# Patient Record
Sex: FEMALE | Race: ASIAN | NOT HISPANIC OR LATINO | URBAN - METROPOLITAN AREA
[De-identification: names, ages, dates, MRNs, and addresses within clinical notes are randomized per-mention and may not be internally consistent; named-entity substitution may affect disease eponyms.]

---

## 2017-04-09 ENCOUNTER — INPATIENT (INPATIENT)
Facility: HOSPITAL | Age: 67
LOS: 8 days | Discharge: NURSING, REHAB OR LONG-TERM CARE | DRG: 563 | End: 2017-04-18
Attending: INTERNAL MEDICINE | Admitting: INTERNAL MEDICINE
Payer: MEDICARE

## 2017-04-09 VITALS
HEART RATE: 84 BPM | TEMPERATURE: 98 F | SYSTOLIC BLOOD PRESSURE: 141 MMHG | OXYGEN SATURATION: 99 % | RESPIRATION RATE: 18 BRPM | DIASTOLIC BLOOD PRESSURE: 76 MMHG
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
## 2017-04-09 DIAGNOSIS — M19.90 UNSPECIFIED OSTEOARTHRITIS, UNSPECIFIED SITE: ICD-10-CM

## 2017-04-09 DIAGNOSIS — I10 ESSENTIAL (PRIMARY) HYPERTENSION: ICD-10-CM

## 2017-04-09 DIAGNOSIS — Z29.9 ENCOUNTER FOR PROPHYLACTIC MEASURES, UNSPECIFIED: ICD-10-CM

## 2017-04-09 DIAGNOSIS — N39.0 URINARY TRACT INFECTION, SITE NOT SPECIFIED: ICD-10-CM

## 2017-04-09 DIAGNOSIS — E11.9 TYPE 2 DIABETES MELLITUS WITHOUT COMPLICATIONS: ICD-10-CM

## 2017-04-09 DIAGNOSIS — S82.143A DISPLACED BICONDYLAR FRACTURE OF UNSPECIFIED TIBIA, INITIAL ENCOUNTER FOR CLOSED FRACTURE: ICD-10-CM

## 2017-04-09 DIAGNOSIS — E03.9 HYPOTHYROIDISM, UNSPECIFIED: ICD-10-CM

## 2017-04-09 DIAGNOSIS — R55 SYNCOPE AND COLLAPSE: ICD-10-CM

## 2017-04-09 LAB
ALBUMIN SERPL ELPH-MCNC: 3.2 G/DL — LOW (ref 3.5–5)
ALP SERPL-CCNC: 88 U/L — SIGNIFICANT CHANGE UP (ref 40–120)
ALT FLD-CCNC: 25 U/L DA — SIGNIFICANT CHANGE UP (ref 10–60)
ANION GAP SERPL CALC-SCNC: 10 MMOL/L — SIGNIFICANT CHANGE UP (ref 5–17)
APPEARANCE UR: CLEAR — SIGNIFICANT CHANGE UP
AST SERPL-CCNC: 16 U/L — SIGNIFICANT CHANGE UP (ref 10–40)
BASOPHILS # BLD AUTO: 0.1 K/UL — SIGNIFICANT CHANGE UP (ref 0–0.2)
BASOPHILS NFR BLD AUTO: 0.9 % — SIGNIFICANT CHANGE UP (ref 0–2)
BILIRUB SERPL-MCNC: 0.2 MG/DL — SIGNIFICANT CHANGE UP (ref 0.2–1.2)
BILIRUB UR-MCNC: NEGATIVE — SIGNIFICANT CHANGE UP
BUN SERPL-MCNC: 19 MG/DL — HIGH (ref 7–18)
CALCIUM SERPL-MCNC: 8.6 MG/DL — SIGNIFICANT CHANGE UP (ref 8.4–10.5)
CHLORIDE SERPL-SCNC: 106 MMOL/L — SIGNIFICANT CHANGE UP (ref 96–108)
CK MB CFR SERPL CALC: 1.6 NG/ML — SIGNIFICANT CHANGE UP (ref 0–3.6)
CO2 SERPL-SCNC: 29 MMOL/L — SIGNIFICANT CHANGE UP (ref 22–31)
COLOR SPEC: YELLOW — SIGNIFICANT CHANGE UP
CREAT SERPL-MCNC: 0.64 MG/DL — SIGNIFICANT CHANGE UP (ref 0.5–1.3)
DIFF PNL FLD: ABNORMAL
EOSINOPHIL # BLD AUTO: 0.3 K/UL — SIGNIFICANT CHANGE UP (ref 0–0.5)
EOSINOPHIL NFR BLD AUTO: 2.5 % — SIGNIFICANT CHANGE UP (ref 0–6)
GLUCOSE SERPL-MCNC: 137 MG/DL — HIGH (ref 70–99)
GLUCOSE UR QL: NEGATIVE — SIGNIFICANT CHANGE UP
HCT VFR BLD CALC: 34.7 % — SIGNIFICANT CHANGE UP (ref 34.5–45)
HGB BLD-MCNC: 11.8 G/DL — SIGNIFICANT CHANGE UP (ref 11.5–15.5)
KETONES UR-MCNC: NEGATIVE — SIGNIFICANT CHANGE UP
LEUKOCYTE ESTERASE UR-ACNC: ABNORMAL
LYMPHOCYTES # BLD AUTO: 3.3 K/UL — SIGNIFICANT CHANGE UP (ref 1–3.3)
LYMPHOCYTES # BLD AUTO: 31.8 % — SIGNIFICANT CHANGE UP (ref 13–44)
MCHC RBC-ENTMCNC: 27.8 PG — SIGNIFICANT CHANGE UP (ref 27–34)
MCHC RBC-ENTMCNC: 33.9 GM/DL — SIGNIFICANT CHANGE UP (ref 32–36)
MCV RBC AUTO: 82.1 FL — SIGNIFICANT CHANGE UP (ref 80–100)
MONOCYTES # BLD AUTO: 0.3 K/UL — SIGNIFICANT CHANGE UP (ref 0–0.9)
MONOCYTES NFR BLD AUTO: 3.2 % — SIGNIFICANT CHANGE UP (ref 2–14)
NEUTROPHILS # BLD AUTO: 6.5 K/UL — SIGNIFICANT CHANGE UP (ref 1.8–7.4)
NEUTROPHILS NFR BLD AUTO: 61.6 % — SIGNIFICANT CHANGE UP (ref 43–77)
NITRITE UR-MCNC: NEGATIVE — SIGNIFICANT CHANGE UP
PH UR: 6 — SIGNIFICANT CHANGE UP (ref 4.8–8)
PLATELET # BLD AUTO: 325 K/UL — SIGNIFICANT CHANGE UP (ref 150–400)
POTASSIUM SERPL-MCNC: 3.9 MMOL/L — SIGNIFICANT CHANGE UP (ref 3.5–5.3)
POTASSIUM SERPL-SCNC: 3.9 MMOL/L — SIGNIFICANT CHANGE UP (ref 3.5–5.3)
PROT SERPL-MCNC: 7.3 G/DL — SIGNIFICANT CHANGE UP (ref 6–8.3)
PROT UR-MCNC: NEGATIVE — SIGNIFICANT CHANGE UP
RBC # BLD: 4.22 M/UL — SIGNIFICANT CHANGE UP (ref 3.8–5.2)
RBC # FLD: 14 % — SIGNIFICANT CHANGE UP (ref 10.3–14.5)
SODIUM SERPL-SCNC: 145 MMOL/L — SIGNIFICANT CHANGE UP (ref 135–145)
SP GR SPEC: 1.01 — SIGNIFICANT CHANGE UP (ref 1.01–1.02)
TROPONIN I SERPL-MCNC: <0.015 NG/ML — SIGNIFICANT CHANGE UP (ref 0–0.04)
TSH SERPL-MCNC: 1.64 UU/ML — SIGNIFICANT CHANGE UP (ref 0.34–4.82)
UROBILINOGEN FLD QL: NEGATIVE — SIGNIFICANT CHANGE UP
WBC # BLD: 10.5 K/UL — SIGNIFICANT CHANGE UP (ref 3.8–10.5)
WBC # FLD AUTO: 10.5 K/UL — SIGNIFICANT CHANGE UP (ref 3.8–10.5)

## 2017-04-09 PROCEDURE — 99285 EMERGENCY DEPT VISIT HI MDM: CPT

## 2017-04-09 PROCEDURE — 71010: CPT | Mod: 26

## 2017-04-09 PROCEDURE — 73700 CT LOWER EXTREMITY W/O DYE: CPT | Mod: 26,RT

## 2017-04-09 PROCEDURE — 70450 CT HEAD/BRAIN W/O DYE: CPT | Mod: 26

## 2017-04-09 RX ORDER — ACETAMINOPHEN 500 MG
650 TABLET ORAL EVERY 6 HOURS
Qty: 0 | Refills: 0 | Status: DISCONTINUED | OUTPATIENT
Start: 2017-04-09 | End: 2017-04-09

## 2017-04-09 RX ORDER — CEFTRIAXONE 500 MG/1
INJECTION, POWDER, FOR SOLUTION INTRAMUSCULAR; INTRAVENOUS
Qty: 0 | Refills: 0 | Status: DISCONTINUED | OUTPATIENT
Start: 2017-04-09 | End: 2017-04-12

## 2017-04-09 RX ORDER — GLUCAGON INJECTION, SOLUTION 0.5 MG/.1ML
1 INJECTION, SOLUTION SUBCUTANEOUS ONCE
Qty: 0 | Refills: 0 | Status: DISCONTINUED | OUTPATIENT
Start: 2017-04-09 | End: 2017-04-14

## 2017-04-09 RX ORDER — LOSARTAN POTASSIUM 100 MG/1
1 TABLET, FILM COATED ORAL
Qty: 0 | Refills: 0 | COMMUNITY

## 2017-04-09 RX ORDER — DEXTROSE 50 % IN WATER 50 %
25 SYRINGE (ML) INTRAVENOUS ONCE
Qty: 0 | Refills: 0 | Status: DISCONTINUED | OUTPATIENT
Start: 2017-04-09 | End: 2017-04-18

## 2017-04-09 RX ORDER — LOSARTAN POTASSIUM 100 MG/1
100 TABLET, FILM COATED ORAL DAILY
Qty: 0 | Refills: 0 | Status: DISCONTINUED | OUTPATIENT
Start: 2017-04-09 | End: 2017-04-18

## 2017-04-09 RX ORDER — LEVOTHYROXINE SODIUM 125 MCG
1 TABLET ORAL
Qty: 0 | Refills: 0 | COMMUNITY

## 2017-04-09 RX ORDER — DEXTROSE 50 % IN WATER 50 %
12.5 SYRINGE (ML) INTRAVENOUS ONCE
Qty: 0 | Refills: 0 | Status: DISCONTINUED | OUTPATIENT
Start: 2017-04-09 | End: 2017-04-14

## 2017-04-09 RX ORDER — ATORVASTATIN CALCIUM 80 MG/1
40 TABLET, FILM COATED ORAL AT BEDTIME
Qty: 0 | Refills: 0 | Status: DISCONTINUED | OUTPATIENT
Start: 2017-04-09 | End: 2017-04-18

## 2017-04-09 RX ORDER — INSULIN LISPRO 100/ML
VIAL (ML) SUBCUTANEOUS
Qty: 0 | Refills: 0 | Status: DISCONTINUED | OUTPATIENT
Start: 2017-04-09 | End: 2017-04-18

## 2017-04-09 RX ORDER — METFORMIN HYDROCHLORIDE 850 MG/1
1 TABLET ORAL
Qty: 0 | Refills: 0 | COMMUNITY

## 2017-04-09 RX ORDER — ENOXAPARIN SODIUM 100 MG/ML
40 INJECTION SUBCUTANEOUS DAILY
Qty: 0 | Refills: 0 | Status: DISCONTINUED | OUTPATIENT
Start: 2017-04-09 | End: 2017-04-18

## 2017-04-09 RX ORDER — ACETAMINOPHEN 500 MG
650 TABLET ORAL ONCE
Qty: 0 | Refills: 0 | Status: COMPLETED | OUTPATIENT
Start: 2017-04-09 | End: 2017-04-09

## 2017-04-09 RX ORDER — CEFTRIAXONE 500 MG/1
1 INJECTION, POWDER, FOR SOLUTION INTRAMUSCULAR; INTRAVENOUS EVERY 24 HOURS
Qty: 0 | Refills: 0 | Status: DISCONTINUED | OUTPATIENT
Start: 2017-04-10 | End: 2017-04-12

## 2017-04-09 RX ORDER — LEVOTHYROXINE SODIUM 125 MCG
50 TABLET ORAL DAILY
Qty: 0 | Refills: 0 | Status: DISCONTINUED | OUTPATIENT
Start: 2017-04-09 | End: 2017-04-18

## 2017-04-09 RX ORDER — TRAMADOL HYDROCHLORIDE 50 MG/1
25 TABLET ORAL EVERY 12 HOURS
Qty: 0 | Refills: 0 | Status: DISCONTINUED | OUTPATIENT
Start: 2017-04-09 | End: 2017-04-12

## 2017-04-09 RX ORDER — SODIUM CHLORIDE 9 MG/ML
1000 INJECTION, SOLUTION INTRAVENOUS
Qty: 0 | Refills: 0 | Status: DISCONTINUED | OUTPATIENT
Start: 2017-04-09 | End: 2017-04-14

## 2017-04-09 RX ORDER — ACETAMINOPHEN 500 MG
650 TABLET ORAL EVERY 6 HOURS
Qty: 0 | Refills: 0 | Status: DISCONTINUED | OUTPATIENT
Start: 2017-04-09 | End: 2017-04-18

## 2017-04-09 RX ORDER — DEXTROSE 50 % IN WATER 50 %
1 SYRINGE (ML) INTRAVENOUS ONCE
Qty: 0 | Refills: 0 | Status: DISCONTINUED | OUTPATIENT
Start: 2017-04-09 | End: 2017-04-14

## 2017-04-09 RX ORDER — IBUPROFEN 200 MG
0 TABLET ORAL
Qty: 0 | Refills: 0 | COMMUNITY

## 2017-04-09 RX ORDER — CEFTRIAXONE 500 MG/1
1 INJECTION, POWDER, FOR SOLUTION INTRAMUSCULAR; INTRAVENOUS ONCE
Qty: 0 | Refills: 0 | Status: COMPLETED | OUTPATIENT
Start: 2017-04-09 | End: 2017-04-09

## 2017-04-09 RX ORDER — ATORVASTATIN CALCIUM 80 MG/1
1 TABLET, FILM COATED ORAL
Qty: 0 | Refills: 0 | COMMUNITY

## 2017-04-09 RX ADMIN — TRAMADOL HYDROCHLORIDE 25 MILLIGRAM(S): 50 TABLET ORAL at 23:15

## 2017-04-09 RX ADMIN — CEFTRIAXONE 100 GRAM(S): 500 INJECTION, POWDER, FOR SOLUTION INTRAMUSCULAR; INTRAVENOUS at 22:30

## 2017-04-09 RX ADMIN — Medication 650 MILLIGRAM(S): at 14:03

## 2017-04-09 RX ADMIN — Medication 50 MILLIGRAM(S): at 23:15

## 2017-04-09 RX ADMIN — ATORVASTATIN CALCIUM 40 MILLIGRAM(S): 80 TABLET, FILM COATED ORAL at 22:30

## 2017-04-09 NOTE — ED PROCEDURE NOTE - CPROC ED POST PROC CARE GUIDE1
Verbal/written post procedure instructions were given to patient/caregiver./Instructed patient/caregiver regarding signs and symptoms of infection./Keep the cast/splint/dressing clean and dry./Instructed patient/caregiver to follow-up with primary care physician./Elevate the injured extremity as instructed.

## 2017-04-09 NOTE — ED PROVIDER NOTE - PHYSICAL EXAMINATION
GENERAL: No acute distress, non toxic  HEAD: Atraumatic, normocephalic  EARS: Externally normal, atraumatic, TMs normal bilaterally  EYES: No jaundice, not injected, no rupture, no foreign bodies  MOUTH: Moist mucous membranes, no open lesion, uvula midline without edema, no exudates, no peritonsilar abscess bilaterally.  NECK: Supple, full range of motion, no swelling, no lymphadenopathy  HEART: Regular rate and rhythm, no murmurs, no rubs, no gallops  LUNGS: Clear to auscultation bilaterally without rhonci, rales, or wheezing  ABDOMEN: Soft and non tender in all 4 quadrants, normal bowel sounds, no signs of trauma, no costovertebral tenderness bilaterally  BACK/SPINE: Non tender spine in cervical/thoracic/lumbar regions, no stepoffs palpable  EXTREMITIES: Tenderness and swelling to R knee, decreased ROM secondary to pain, no open wounds.   VASCULAR: Pulses palpable in all extremities, no pitting edema, capillary refill <2 secs  SKIN: Grossly intact without rash or open wounds  PSYCH: Alert and oriented x 3  GAIT: Normal without need for assistance

## 2017-04-09 NOTE — H&P ADULT. - RS GEN PE MLT RESP DETAILS PC
good air movement/clear to auscultation bilaterally/no wheezes/no chest wall tenderness/respirations non-labored/airway patent/no rhonchi/no rales/normal/breath sounds equal/no intercostal retractions/no subcutaneous emphysema

## 2017-04-09 NOTE — H&P ADULT. - NEGATIVE SKIN SYMPTOMS
no change in size/color of mole/no pitted nails/no tumor/no rash/no dryness/no itching/no brittle nails

## 2017-04-09 NOTE — H&P ADULT. - GASTROINTESTINAL DETAILS
soft/no guarding/no rebound tenderness/nontender/normal/no organomegaly/no distention/no rigidity/bowel sounds normal/no bruit/no masses palpable

## 2017-04-09 NOTE — H&P ADULT. - ATTENDING COMMENTS
66 year old female lives at home alone , AAO x 3 , walks with walker hx b/l knee arthritis , baseline trouble in walking , DM ( on metformin ) , HTN , Hypothyroidism came her for 1 episode of fall yesterday evening. As per patient she was in her usual state of health until yesterday evening when she was walking in to apartment and she tripped fell near by elevator. She denies any warmth feeling , chest pain , palpitations, N/V, dizziness before after or during fall. She was on ground for 5 min . She had 8/10 RLE pain after fall episode, No RLE swelling or warmth or redness. CT Rt knee showed - Mildly displaced avulsion fracture at the posterior aspect of the lateral tibial plateau at the PCL insertion, CT head - negative , CXR - clear , EKG - NSR    pt seen in bed, vitals stable, physical exam reveals lungs cta b/l, heart s1s2, abd soft nd nt bs+, ext no edema. labs and diagnostic test result reviewed.    assessment  --  left tibial plateau fx, s/p fall, uti, h/o b/l knee arthritis ,DM, HTN, Hypothyroidism    plan  --  admit to med, rocephin, cont preadmit home meds, gi and dvt profilaxis,  cbc, bmp, mg, phos, lipids, tsh, bld cx, ua, ucx,    echocard    ortho cons

## 2017-04-09 NOTE — H&P ADULT. - PROBLEM SELECTOR PLAN 1
as per scenario is mechanical fall with displaced avulsion fracture at the posterior aspect of the lateral tibial plateau at the PCL insertion but Diabetic neuropathy can be contributing factor of her fall  No chest pain , No dizziness , No palpitations or stroke like symptoms   In mild distress due to pain ; normotensive   non focal neuro exam , cardio exam unremarkable   pain mx with Tylenol , tramadol prn   CT head - negative  CXR - no pneumothorax  CT Rt knee showed - Mildly displaced avulsion fracture at the posterior aspect of the lateral tibial plateau at the PCL insertion  ortho consult Dr. Cruz to be consulted  f/U CBC , BMP , TSH, folate , B12, HbA1c   f/u PT recs

## 2017-04-09 NOTE — H&P ADULT. - NEGATIVE GENERAL SYMPTOMS
no polyphagia/no sweating/no polyuria/no polydipsia/no weight gain/no fever/no chills/no anorexia/no weight loss

## 2017-04-09 NOTE — ED PROVIDER NOTE - NS ED MD SCRIBE ATTENDING SCRIBE SECTIONS
DISPOSITION/VITAL SIGNS( Pullset)/REVIEW OF SYSTEMS/HISTORY OF PRESENT ILLNESS/PAST MEDICAL/SURGICAL/SOCIAL HISTORY/PHYSICAL EXAM

## 2017-04-09 NOTE — ED PROCEDURE NOTE - NS ED PERI VASCULAR NEG
no swelling/no cyanosis of extremity/no paresthesia/capillary refill time < 2 seconds/fingers/toes warm to touch

## 2017-04-09 NOTE — ED PROVIDER NOTE - MEDICAL DECISION MAKING DETAILS
65 y/o F pt w/ PMHx of DM, HTN, Colitis and Arthritis sent to [facility] reporting that she had a syncopal episode lasting 5 minutes leading to R knee pain. Pt unable to walk. Will send to main ED for further evaluation.

## 2017-04-09 NOTE — H&P ADULT. - PROBLEM SELECTOR PLAN 4
c/w tylenol , tramadol prn pain meds  takes ibuprofen at home  consider ibuprofen if pain is not controlled on above meds

## 2017-04-09 NOTE — H&P ADULT. - HISTORY OF PRESENT ILLNESS
66 year old female lives at home alone , AAO x 3 , walks with walker hx b/l knee arthritis , baseline trouble in walking , DM ( on metformin ) , HTN , Hypothyroidism came her for 1 episode of fall yesterday evening. As per patient she was in her usual state of health until yesterday evening when she was walking in to apartment and she tripped fell near by elevator. She denies any warmth feeling , chest pain , palpitations, N/V, dizziness before after or during fall. She was on ground for 5 min . She had 8/10 RLE pain after fall episode, No RLE swelling or warmth or redness. CT Rt knee showed - Mildly displaced avulsion fracture at the posterior aspect of the lateral tibial plateau at the PCL insertion , CT head - negative , CXR - clear , EKG - NSR

## 2017-04-09 NOTE — H&P ADULT. - NEGATIVE CARDIOVASCULAR SYMPTOMS
no dyspnea on exertion/no chest pain/no peripheral edema/no claudication/no palpitations/no orthopnea/no paroxysmal nocturnal dyspnea

## 2017-04-09 NOTE — H&P ADULT. - NEGATIVE GASTROINTESTINAL SYMPTOMS
no melena/no constipation/no jaundice/no change in bowel habits/no abdominal pain/no steatorrhea/no hematochezia/no vomiting/no hiccoughs/no nausea/no diarrhea/no flatulence

## 2017-04-09 NOTE — ED PROVIDER NOTE - OBJECTIVE STATEMENT
67 y/o F pt w/ PMHx DM, HTN, Colitis, and Arthritis presents to ED c/o R knee pain s/p fall yesterday. Pt states that she is unaware of how she fell; pt was on the ground for about 5 minutes. Pt states that she did not have any chest pain or difficulty breathing prior to falling. Pt denies head trauma, numbness/tingling, focal weakness, or any other complaints. Pt states that she has been unable to ambulate secondary to pain. Pt is allergic to Codeine (Unknown).

## 2017-04-09 NOTE — ED PROVIDER NOTE - PROGRESS NOTE DETAILS
Jignesh DO: Pt is neurovascularly intact in knee brace. pt with unexplained fall. will admit for monitoring and ortho consult. MAR endorsed. Pt agrees with admission. I had a detailed discussion with the patient and/or guardian regarding the historical points, exam findings, and any diagnostic results supporting the admit diagnosis. Dr. Alvarado was paged.

## 2017-04-09 NOTE — H&P ADULT. - NEGATIVE NEUROLOGICAL SYMPTOMS
no loss of sensation/no difficulty walking/no tremors/no facial palsy/no generalized seizures/no vertigo/no headache/no transient paralysis/no weakness/no loss of consciousness/no hemiparesis/no paresthesias/no confusion/no syncope/no focal seizures

## 2017-04-09 NOTE — H&P ADULT. - ASSESSMENT
66 year old female lives at home alone , AAO x 3 , walks with walker hx b/l knee arthritis , baseline trouble in walking , DM ( on metformin ) , HTN , Hypothyroidism came her for 1 episode of fall yesterday evening. As per patient she was in her usual state of health until yesterday evening when she was walking in to apartment and she tripped fell near by elevator. She denies any warmth feeling , chest pain , palpitations, N/V, dizziness before after or during fall. She was on ground for 5 min . She had 8/10 RLE pain after fall episode, No RLE swelling or warmth or redness. CT Rt knee showed - Mildly displaced avulsion fracture at the posterior aspect of the lateral tibial plateau at the PCL insertion , CT head - negative , CXR - clear , EKG - NSR    Admitted for mechanical fall with displaced avulsion fracture at the posterior aspect of the lateral tibial plateau at the PCL insertion

## 2017-04-09 NOTE — H&P ADULT. - NEUROLOGICAL DETAILS
cranial nerves intact/sensation intact/normal strength/no spontaneous movement/superficial reflexes intact/deep reflexes intact/responds to pain/alert and oriented x 3/responds to verbal commands

## 2017-04-10 LAB
24R-OH-CALCIDIOL SERPL-MCNC: 40.6 NG/ML — SIGNIFICANT CHANGE UP (ref 30–100)
ANION GAP SERPL CALC-SCNC: 8 MMOL/L — SIGNIFICANT CHANGE UP (ref 5–17)
BASOPHILS # BLD AUTO: 0.1 K/UL — SIGNIFICANT CHANGE UP (ref 0–0.2)
BASOPHILS NFR BLD AUTO: 0.8 % — SIGNIFICANT CHANGE UP (ref 0–2)
BUN SERPL-MCNC: 18 MG/DL — SIGNIFICANT CHANGE UP (ref 7–18)
CALCIUM SERPL-MCNC: 8.4 MG/DL — SIGNIFICANT CHANGE UP (ref 8.4–10.5)
CHLORIDE SERPL-SCNC: 108 MMOL/L — SIGNIFICANT CHANGE UP (ref 96–108)
CHOLEST SERPL-MCNC: 168 MG/DL — SIGNIFICANT CHANGE UP (ref 10–199)
CO2 SERPL-SCNC: 27 MMOL/L — SIGNIFICANT CHANGE UP (ref 22–31)
CREAT SERPL-MCNC: 0.6 MG/DL — SIGNIFICANT CHANGE UP (ref 0.5–1.3)
EOSINOPHIL # BLD AUTO: 0.3 K/UL — SIGNIFICANT CHANGE UP (ref 0–0.5)
EOSINOPHIL NFR BLD AUTO: 2.7 % — SIGNIFICANT CHANGE UP (ref 0–6)
FOLATE SERPL-MCNC: >20 NG/ML — SIGNIFICANT CHANGE UP (ref 4.8–24.2)
GLUCOSE SERPL-MCNC: 133 MG/DL — HIGH (ref 70–99)
HBA1C BLD-MCNC: 6.2 % — HIGH (ref 4–5.6)
HCT VFR BLD CALC: 36.9 % — SIGNIFICANT CHANGE UP (ref 34.5–45)
HDLC SERPL-MCNC: 46 MG/DL — SIGNIFICANT CHANGE UP (ref 40–125)
HGB BLD-MCNC: 12 G/DL — SIGNIFICANT CHANGE UP (ref 11.5–15.5)
LIPID PNL WITH DIRECT LDL SERPL: 98 MG/DL — SIGNIFICANT CHANGE UP
LYMPHOCYTES # BLD AUTO: 25.9 % — SIGNIFICANT CHANGE UP (ref 13–44)
LYMPHOCYTES # BLD AUTO: 3.1 K/UL — SIGNIFICANT CHANGE UP (ref 1–3.3)
MAGNESIUM SERPL-MCNC: 1.8 MG/DL — SIGNIFICANT CHANGE UP (ref 1.8–2.4)
MCHC RBC-ENTMCNC: 27.2 PG — SIGNIFICANT CHANGE UP (ref 27–34)
MCHC RBC-ENTMCNC: 32.4 GM/DL — SIGNIFICANT CHANGE UP (ref 32–36)
MCV RBC AUTO: 84 FL — SIGNIFICANT CHANGE UP (ref 80–100)
MONOCYTES # BLD AUTO: 0.8 K/UL — SIGNIFICANT CHANGE UP (ref 0–0.9)
MONOCYTES NFR BLD AUTO: 6.4 % — SIGNIFICANT CHANGE UP (ref 2–14)
NEUTROPHILS # BLD AUTO: 7.7 K/UL — HIGH (ref 1.8–7.4)
NEUTROPHILS NFR BLD AUTO: 64.1 % — SIGNIFICANT CHANGE UP (ref 43–77)
PHOSPHATE SERPL-MCNC: 4 MG/DL — SIGNIFICANT CHANGE UP (ref 2.5–4.5)
PLATELET # BLD AUTO: 336 K/UL — SIGNIFICANT CHANGE UP (ref 150–400)
POTASSIUM SERPL-MCNC: 3.9 MMOL/L — SIGNIFICANT CHANGE UP (ref 3.5–5.3)
POTASSIUM SERPL-SCNC: 3.9 MMOL/L — SIGNIFICANT CHANGE UP (ref 3.5–5.3)
RBC # BLD: 4.39 M/UL — SIGNIFICANT CHANGE UP (ref 3.8–5.2)
RBC # FLD: 14.2 % — SIGNIFICANT CHANGE UP (ref 10.3–14.5)
SODIUM SERPL-SCNC: 143 MMOL/L — SIGNIFICANT CHANGE UP (ref 135–145)
TOTAL CHOLESTEROL/HDL RATIO MEASUREMENT: 3.7 RATIO — SIGNIFICANT CHANGE UP (ref 3.3–7.1)
TRIGL SERPL-MCNC: 118 MG/DL — SIGNIFICANT CHANGE UP (ref 10–149)
TSH SERPL-MCNC: 4.59 UU/ML — SIGNIFICANT CHANGE UP (ref 0.34–4.82)
VIT B12 SERPL-MCNC: 535 PG/ML — SIGNIFICANT CHANGE UP (ref 243–894)
WBC # BLD: 12 K/UL — HIGH (ref 3.8–10.5)
WBC # FLD AUTO: 12 K/UL — HIGH (ref 3.8–10.5)

## 2017-04-10 RX ORDER — TRAMADOL HYDROCHLORIDE 50 MG/1
25 TABLET ORAL ONCE
Qty: 0 | Refills: 0 | Status: DISCONTINUED | OUTPATIENT
Start: 2017-04-10 | End: 2017-04-10

## 2017-04-10 RX ADMIN — Medication 50 MILLIGRAM(S): at 05:57

## 2017-04-10 RX ADMIN — TRAMADOL HYDROCHLORIDE 25 MILLIGRAM(S): 50 TABLET ORAL at 18:48

## 2017-04-10 RX ADMIN — ENOXAPARIN SODIUM 40 MILLIGRAM(S): 100 INJECTION SUBCUTANEOUS at 11:46

## 2017-04-10 RX ADMIN — TRAMADOL HYDROCHLORIDE 25 MILLIGRAM(S): 50 TABLET ORAL at 18:54

## 2017-04-10 RX ADMIN — TRAMADOL HYDROCHLORIDE 25 MILLIGRAM(S): 50 TABLET ORAL at 12:49

## 2017-04-10 RX ADMIN — Medication 50 MICROGRAM(S): at 05:57

## 2017-04-10 RX ADMIN — TRAMADOL HYDROCHLORIDE 25 MILLIGRAM(S): 50 TABLET ORAL at 14:11

## 2017-04-10 RX ADMIN — Medication 2: at 11:44

## 2017-04-10 RX ADMIN — ATORVASTATIN CALCIUM 40 MILLIGRAM(S): 80 TABLET, FILM COATED ORAL at 21:48

## 2017-04-10 RX ADMIN — Medication 50 MILLIGRAM(S): at 18:49

## 2017-04-10 RX ADMIN — Medication 650 MILLIGRAM(S): at 03:51

## 2017-04-10 RX ADMIN — TRAMADOL HYDROCHLORIDE 25 MILLIGRAM(S): 50 TABLET ORAL at 00:10

## 2017-04-10 RX ADMIN — Medication 650 MILLIGRAM(S): at 04:59

## 2017-04-10 RX ADMIN — Medication 650 MILLIGRAM(S): at 18:05

## 2017-04-10 RX ADMIN — CEFTRIAXONE 100 GRAM(S): 500 INJECTION, POWDER, FOR SOLUTION INTRAMUSCULAR; INTRAVENOUS at 21:49

## 2017-04-10 RX ADMIN — LOSARTAN POTASSIUM 100 MILLIGRAM(S): 100 TABLET, FILM COATED ORAL at 05:57

## 2017-04-10 RX ADMIN — Medication 650 MILLIGRAM(S): at 16:36

## 2017-04-10 NOTE — PHYSICAL THERAPY INITIAL EVALUATION ADULT - CRITERIA FOR SKILLED THERAPEUTIC INTERVENTIONS
rehab potential/risk reduction/prevention/impairments found/anticipated equipment needs at discharge/anticipated discharge recommendation/predicted duration of therapy intervention/therapy frequency

## 2017-04-10 NOTE — PHYSICAL THERAPY INITIAL EVALUATION ADULT - ACTIVE RANGE OF MOTION EXAMINATION, REHAB EVAL
Left LE Active ROM was WFL (within functional limits)/bilateral upper extremity Active ROM was WFL (within functional limits)/Right LE AROM limited due to pain

## 2017-04-10 NOTE — PHYSICAL THERAPY INITIAL EVALUATION ADULT - DISCHARGE DISPOSITION, PT EVAL
Discharge to sub-acute rehab as of now; Pending on surgery consult and pt's progress/rehabilitation facility

## 2017-04-10 NOTE — PHYSICAL THERAPY INITIAL EVALUATION ADULT - PRECAUTIONS/LIMITATIONS, REHAB EVAL
fall precautions as per PT consult request - states no weight bearing restrictions but due to recent findings on the knee on CT and awaiting ortho consult PT assessment was done NWB on right LE/fall precautions

## 2017-04-10 NOTE — PHYSICAL THERAPY INITIAL EVALUATION ADULT - ADDITIONAL COMMENTS
Pt reports she uses a cane or walker at home. Lives in a walk-up apartment and requires ~25 steps to enter home. Has [facility] come in 6 hours/3 days. Reported she has had trouble on her RLE but suddenly it buckled and she fell.

## 2017-04-11 LAB
ANION GAP SERPL CALC-SCNC: 8 MMOL/L — SIGNIFICANT CHANGE UP (ref 5–17)
BUN SERPL-MCNC: 12 MG/DL — SIGNIFICANT CHANGE UP (ref 7–18)
CALCIUM SERPL-MCNC: 8.4 MG/DL — SIGNIFICANT CHANGE UP (ref 8.4–10.5)
CHLORIDE SERPL-SCNC: 107 MMOL/L — SIGNIFICANT CHANGE UP (ref 96–108)
CO2 SERPL-SCNC: 28 MMOL/L — SIGNIFICANT CHANGE UP (ref 22–31)
CREAT SERPL-MCNC: 0.54 MG/DL — SIGNIFICANT CHANGE UP (ref 0.5–1.3)
CULTURE RESULTS: SIGNIFICANT CHANGE UP
GLUCOSE SERPL-MCNC: 131 MG/DL — HIGH (ref 70–99)
HCT VFR BLD CALC: 35.7 % — SIGNIFICANT CHANGE UP (ref 34.5–45)
HGB BLD-MCNC: 11.5 G/DL — SIGNIFICANT CHANGE UP (ref 11.5–15.5)
MCHC RBC-ENTMCNC: 27.2 PG — SIGNIFICANT CHANGE UP (ref 27–34)
MCHC RBC-ENTMCNC: 32.3 GM/DL — SIGNIFICANT CHANGE UP (ref 32–36)
MCV RBC AUTO: 84.1 FL — SIGNIFICANT CHANGE UP (ref 80–100)
PLATELET # BLD AUTO: 309 K/UL — SIGNIFICANT CHANGE UP (ref 150–400)
POTASSIUM SERPL-MCNC: 3.7 MMOL/L — SIGNIFICANT CHANGE UP (ref 3.5–5.3)
POTASSIUM SERPL-SCNC: 3.7 MMOL/L — SIGNIFICANT CHANGE UP (ref 3.5–5.3)
RBC # BLD: 4.24 M/UL — SIGNIFICANT CHANGE UP (ref 3.8–5.2)
RBC # FLD: 14.2 % — SIGNIFICANT CHANGE UP (ref 10.3–14.5)
SODIUM SERPL-SCNC: 143 MMOL/L — SIGNIFICANT CHANGE UP (ref 135–145)
SPECIMEN SOURCE: SIGNIFICANT CHANGE UP
WBC # BLD: 12.3 K/UL — HIGH (ref 3.8–10.5)
WBC # FLD AUTO: 12.3 K/UL — HIGH (ref 3.8–10.5)

## 2017-04-11 RX ADMIN — Medication 650 MILLIGRAM(S): at 12:20

## 2017-04-11 RX ADMIN — ATORVASTATIN CALCIUM 40 MILLIGRAM(S): 80 TABLET, FILM COATED ORAL at 21:46

## 2017-04-11 RX ADMIN — TRAMADOL HYDROCHLORIDE 25 MILLIGRAM(S): 50 TABLET ORAL at 16:08

## 2017-04-11 RX ADMIN — Medication 50 MICROGRAM(S): at 05:15

## 2017-04-11 RX ADMIN — Medication 50 MILLIGRAM(S): at 05:15

## 2017-04-11 RX ADMIN — ENOXAPARIN SODIUM 40 MILLIGRAM(S): 100 INJECTION SUBCUTANEOUS at 11:19

## 2017-04-11 RX ADMIN — Medication 650 MILLIGRAM(S): at 05:55

## 2017-04-11 RX ADMIN — CEFTRIAXONE 100 GRAM(S): 500 INJECTION, POWDER, FOR SOLUTION INTRAMUSCULAR; INTRAVENOUS at 21:00

## 2017-04-11 RX ADMIN — Medication 650 MILLIGRAM(S): at 06:16

## 2017-04-11 RX ADMIN — Medication 2: at 11:49

## 2017-04-11 RX ADMIN — Medication: at 17:51

## 2017-04-11 RX ADMIN — TRAMADOL HYDROCHLORIDE 25 MILLIGRAM(S): 50 TABLET ORAL at 01:34

## 2017-04-11 RX ADMIN — TRAMADOL HYDROCHLORIDE 25 MILLIGRAM(S): 50 TABLET ORAL at 01:53

## 2017-04-11 RX ADMIN — TRAMADOL HYDROCHLORIDE 25 MILLIGRAM(S): 50 TABLET ORAL at 17:33

## 2017-04-11 RX ADMIN — Medication 50 MILLIGRAM(S): at 17:50

## 2017-04-11 RX ADMIN — Medication 650 MILLIGRAM(S): at 11:18

## 2017-04-12 RX ORDER — IBUPROFEN 200 MG
1 TABLET ORAL
Qty: 0 | Refills: 0 | COMMUNITY

## 2017-04-12 RX ORDER — ACETAMINOPHEN 500 MG
2 TABLET ORAL
Qty: 0 | Refills: 0 | COMMUNITY
Start: 2017-04-12

## 2017-04-12 RX ORDER — TRAMADOL HYDROCHLORIDE 50 MG/1
50 TABLET ORAL EVERY 6 HOURS
Qty: 0 | Refills: 0 | Status: DISCONTINUED | OUTPATIENT
Start: 2017-04-12 | End: 2017-04-18

## 2017-04-12 RX ORDER — TRAMADOL HYDROCHLORIDE 50 MG/1
1 TABLET ORAL
Qty: 0 | Refills: 0 | COMMUNITY
Start: 2017-04-12

## 2017-04-12 RX ADMIN — TRAMADOL HYDROCHLORIDE 50 MILLIGRAM(S): 50 TABLET ORAL at 16:01

## 2017-04-12 RX ADMIN — Medication 375 MILLIGRAM(S): at 23:01

## 2017-04-12 RX ADMIN — Medication 50 MILLIGRAM(S): at 05:08

## 2017-04-12 RX ADMIN — Medication 650 MILLIGRAM(S): at 23:04

## 2017-04-12 RX ADMIN — Medication 650 MILLIGRAM(S): at 00:42

## 2017-04-12 RX ADMIN — Medication 375 MILLIGRAM(S): at 18:30

## 2017-04-12 RX ADMIN — TRAMADOL HYDROCHLORIDE 25 MILLIGRAM(S): 50 TABLET ORAL at 04:32

## 2017-04-12 RX ADMIN — Medication 2: at 12:05

## 2017-04-12 RX ADMIN — TRAMADOL HYDROCHLORIDE 25 MILLIGRAM(S): 50 TABLET ORAL at 05:02

## 2017-04-12 RX ADMIN — Medication 650 MILLIGRAM(S): at 01:12

## 2017-04-12 RX ADMIN — Medication 50 MILLIGRAM(S): at 18:00

## 2017-04-12 RX ADMIN — Medication 50 MICROGRAM(S): at 05:09

## 2017-04-12 RX ADMIN — Medication 650 MILLIGRAM(S): at 23:34

## 2017-04-12 RX ADMIN — LOSARTAN POTASSIUM 100 MILLIGRAM(S): 100 TABLET, FILM COATED ORAL at 05:09

## 2017-04-12 RX ADMIN — ATORVASTATIN CALCIUM 40 MILLIGRAM(S): 80 TABLET, FILM COATED ORAL at 23:01

## 2017-04-12 RX ADMIN — Medication 375 MILLIGRAM(S): at 18:01

## 2017-04-12 RX ADMIN — TRAMADOL HYDROCHLORIDE 50 MILLIGRAM(S): 50 TABLET ORAL at 13:27

## 2017-04-12 RX ADMIN — Medication 375 MILLIGRAM(S): at 23:31

## 2017-04-12 RX ADMIN — ENOXAPARIN SODIUM 40 MILLIGRAM(S): 100 INJECTION SUBCUTANEOUS at 12:05

## 2017-04-12 NOTE — DISCHARGE NOTE ADULT - CARE PLAN
Principal Discharge DX:	Tibial plateau fracture  Goal:	to ambulate with free of pain  Instructions for follow-up, activity and diet:	to be pain free.  follow up with orthopedic for knee replacement.  Secondary Diagnosis:	Arthritis  Instructions for follow-up, activity and diet:	take medication regularly.   follow up with orthopedic for severe knee arthritis.  Secondary Diagnosis:	DM (diabetes mellitus) Principal Discharge DX:	Tibial plateau fracture  Goal:	to ambulate with free of pain  Instructions for follow-up, activity and diet:	discharged to [facility] ([place]) c/w weight bearing as tolerated and give time the fracture to decrease swelling and heal. Then she needs to be followed up with ortho for knee replacement.   continue with pain management  Secondary Diagnosis:	Arthritis  Instructions for follow-up, activity and diet:	take medication regularly.   follow up with orthopedic for severe knee arthritis.  Secondary Diagnosis:	DM (diabetes mellitus)  Instructions for follow-up, activity and diet:	continue with metformin Principal Discharge DX:	Tibial plateau fracture  Goal:	to ambulate with free of pain  Instructions for follow-up, activity and diet:	discharged to [facility] ([place]) c/w weight bearing as tolerated and give time the fracture to decrease swelling and heal. Then she needs to be followed up with ortho for knee replacement.   continue with pain management  Secondary Diagnosis:	Arthritis  Instructions for follow-up, activity and diet:	take medication regularly.   follow up with orthopedic for severe knee arthritis.  Secondary Diagnosis:	DM (diabetes mellitus)  Instructions for follow-up, activity and diet:	continue with metformin Principal Discharge DX:	Tibial plateau fracture  Goal:	to ambulate with free of pain  Instructions for follow-up, activity and diet:	discharged to [facility] ([place]) c/w weight bearing as tolerated and give time the fracture to decrease swelling and heal. Then she needs to be followed up with ortho for knee replacement.   continue with pain management  Secondary Diagnosis:	Arthritis  Instructions for follow-up, activity and diet:	take medication regularly.   follow up with orthopedic for severe knee arthritis.  Secondary Diagnosis:	DM (diabetes mellitus)  Instructions for follow-up, activity and diet:	continue with metformin Principal Discharge DX:	Tibial plateau fracture  Goal:	to ambulate with free of pain  Instructions for follow-up, activity and diet:	discharged to [facility] ([place]) c/w weight bearing as tolerated and give time the fracture to decrease swelling and heal. Then she needs to be followed up with ortho for knee replacement.   continue with pain management  Secondary Diagnosis:	Arthritis  Instructions for follow-up, activity and diet:	take medication regularly.   follow up with orthopedic for severe knee arthritis.  Secondary Diagnosis:	DM (diabetes mellitus)  Instructions for follow-up, activity and diet:	continue with metformin Principal Discharge DX:	Tibial plateau fracture  Goal:	to ambulate with free of pain  Instructions for follow-up, activity and diet:	discharged to [facility] ([place]) c/w weight bearing as tolerated and give time the fracture to decrease swelling and heal. Then she needs to be followed up with ortho for knee replacement.   continue with pain management  Secondary Diagnosis:	Arthritis  Instructions for follow-up, activity and diet:	take medication regularly.   follow up with orthopedic for severe knee arthritis.  Secondary Diagnosis:	DM (diabetes mellitus)  Instructions for follow-up, activity and diet:	continue with metformin Principal Discharge DX:	Tibial plateau fracture  Goal:	to ambulate with free of pain  Instructions for follow-up, activity and diet:	discharged to [facility] ([place]) c/w weight bearing as tolerated and give time the fracture to decrease swelling and heal. Then she needs to be followed up with ortho for knee replacement.   continue with pain management  Secondary Diagnosis:	Arthritis  Instructions for follow-up, activity and diet:	take medication regularly.   follow up with orthopedic for severe knee arthritis.  Secondary Diagnosis:	DM (diabetes mellitus)  Instructions for follow-up, activity and diet:	continue with metformin

## 2017-04-12 NOTE — DISCHARGE NOTE ADULT - HOSPITAL COURSE
66 year old female lives at home alone , AAO x 3 , walks with walker hx b/l knee arthritis , baseline trouble in walking , DM ( on metformin ) , HTN , Hypothyroidism admitted after mechanical fall.   Admitted for mechanical fall with displaced avulsion fracture at the posterior aspect of the lateral tibial plateau at the PCL insertion    # Fracture of Tibia Rt   displaced avulsion fracture at the posterior aspect of the lateral tibial plateau at the PCL insertion   secondary to fall which was most likely mechanical 2/2 Arthritis vs DM neuropathy.   pain mx with Tylenol , tramadol prn   CT Rt knee showed - Mildly displaced avulsion fracture at the posterior aspect of the lateral tibial plateau at the PCL insertion  ortho recommended total knee replacement as she has very bad knee joint. She is going to be discharged to [facility] and give time the fracture to decrease swelling and heal. Then she needs to be followed up with ortho for knee replacement.     # HTN (hypertension).   c/w home losartan 100 mg daily   Goal BP < 150/90 mmhg       # DM (diabetes mellitus).     she takes metformin 500 BID at home. In hospital, her sugar was controlled with HSS.     # Arthritis.  Plan: c/w tylenol , tramadol prn pain meds  takes ibuprofen at home    #: Hypothyroidism.   c/w synthroid 50 mcg daily     #UTI (urinary tract infection).  asymptomatic, afebrile ,no leucocytosis   UA - +ve 26-50 WBC. c/w ABx - rocephin  Urine culture shows only normal sloan bacteria.   she received total 3 dose of IV rocephin. Discontinued antibiotics.     # Prophylactic measure.  Plan: c/w lovenox 40 mg daily. 67 yo Female from home, lives alone, AAO x 3, walks w/ walker (baseline trouble walking), PMH b/l knee OA, DM2 (on metformin), HTN, Hypothyroidism admitted after mechanical fall, found to have right tibial fracture.    #Fracture of Tibia Rt, secondary to fall which was most likely mechanical 2/2 Arthritis +/- DM neuropathy.   CT Rt knee showed - Mildly displaced avulsion fracture at the posterior aspect of the lateral tibial plateau at the PCL insertion  pain mx with Tylenol, tramadol prn   ortho recommended total knee replacement  She is going to be discharged to [facility] ([place]) c/w weight bearing as tolerated and give time the fracture to decrease swelling and heal. Then she needs to be followed up with ortho for knee replacement.     #HTN c/w home losartan 100 mg daily, Goal BP < 150/90 mmhg   c/w DASH diet.     #DM2 HbA1c 6.2, takes metformin 500 BID at home, c/w HSS     #Arthritis: c/w tylenol, tramadol prn pain meds  takes ibuprofen at home    #Hypothyroidism: c/w synthroid 50 mcg daily     #UTI, asymptomatic, afebrile, no leucocytosis. UA - +ve 26-50 WBC. UCx shows only normal sloan bacteria.   she received total 3 dose of IV Rocephin. Discontinued antibiotics.     #DVT ppx: c/w lovenox 40 mg daily.     As per attending pt. is stable for discharge.

## 2017-04-12 NOTE — DISCHARGE NOTE ADULT - MEDICATION SUMMARY - MEDICATIONS TO TAKE
I will START or STAY ON the medications listed below when I get home from the hospital:    acetaminophen 325 mg oral tablet  -- 2 tab(s) by mouth every 6 hours, As needed, Mild Pain (1 - 3)  -- Indication: For Arthritis    naproxen 375 mg oral tablet  -- 1 tab(s) by mouth every 8 hours  -- Indication: For Arthritis    traMADol 50 mg oral tablet  -- 1 tab(s) by mouth every 6 hours, As needed, Severe Pain (7 - 10)  -- Indication: For Arthritis    losartan 100 mg oral tablet  -- 1 tab(s) by mouth once a day  -- Indication: For HTN (hypertension)    Lyrica 50 mg oral capsule  -- 1 cap(s) by mouth 3 times a day  -- Indication: For Pain    metFORMIN 500 mg oral tablet  -- 1 tab(s) by mouth 2 times a day  -- Indication: For DM (diabetes mellitus)    atorvastatin 40 mg oral tablet  -- 1 tab(s) by mouth once a day  -- Indication: For Hyperlipidemia    levothyroxine 50 mcg (0.05 mg) oral capsule  -- 1 cap(s) by mouth once a day  -- Indication: For Hypothyroidism I will START or STAY ON the medications listed below when I get home from the hospital:    acetaminophen 325 mg oral tablet  -- 2 tab(s) by mouth every 6 hours, As needed, Mild Pain (1 - 3)  -- Indication: For Arthritis    naproxen 375 mg oral tablet  -- 1 tab(s) by mouth every 8 hours  -- Indication: For Arthritis    traMADol 50 mg oral tablet  -- 1 tab(s) by mouth every 6 hours, As needed, Severe Pain (7 - 10)  -- Indication: For Arthritis    losartan 100 mg oral tablet  -- 1 tab(s) by mouth once a day  -- Indication: For HTN (hypertension)    Lyrica 50 mg oral capsule  -- 1 cap(s) by mouth 3 times a day  -- Indication: For Pain    metFORMIN 500 mg oral tablet  -- 1 tab(s) by mouth 2 times a day  -- Indication: For DM (diabetes mellitus)    atorvastatin 40 mg oral tablet  -- 1 tab(s) by mouth once a day  -- Indication: For Hyperlipidemia    nicotine 2 mg oral transmucosal gum  -- 2 gum oral transmucosal every 2 hours  -- Indication: For Smoking cessation    levothyroxine 50 mcg (0.05 mg) oral capsule  -- 1 cap(s) by mouth once a day  -- Indication: For Hypothyroidism I will START or STAY ON the medications listed below when I get home from the hospital:    acetaminophen 325 mg oral tablet  -- 2 tab(s) by mouth every 6 hours, As needed, Mild Pain (1 - 3)  -- Indication: For Arthritis    naproxen 375 mg oral tablet  -- 1 tab(s) by mouth every 8 hours  -- Indication: For Arthritis    traMADol 50 mg oral tablet  -- 1 tab(s) by mouth every 6 hours, As needed, Severe Pain (7 - 10)  -- Indication: For Arthritis    losartan 100 mg oral tablet  -- 1 tab(s) by mouth once a day  -- Indication: For HTN (hypertension)    Lyrica 50 mg oral capsule  -- 1 cap(s) by mouth 3 times a day  -- Indication: For Pain    metFORMIN 500 mg oral tablet  -- 1 tab(s) by mouth 2 times a day  -- Indication: For DM (diabetes mellitus)    atorvastatin 40 mg oral tablet  -- 1 tab(s) by mouth once a day  -- Indication: For Hyperlipidemia    hydroCHLOROthiazide 25 mg oral tablet  -- 1 tab(s) by mouth once a day  -- Indication: For HTN (hypertension)    nicotine 2 mg oral transmucosal gum  -- 2 gum oral transmucosal every 2 hours  -- Indication: For Smoking cessation    levothyroxine 50 mcg (0.05 mg) oral capsule  -- 1 cap(s) by mouth once a day  -- Indication: For Hypothyroidism

## 2017-04-12 NOTE — DISCHARGE NOTE ADULT - PATIENT PORTAL LINK [facility]
“You can access the Follow[employer] Patient Portal, offered by [facility], by registering with the following website: [email or web address]”

## 2017-04-12 NOTE — DISCHARGE NOTE ADULT - MEDICATION SUMMARY - MEDICATIONS TO STOP TAKING
I will STOP taking the medications listed below when I get home from the hospital:    ibuprofen 400 mg oral tablet  -- 1 tab(s) by mouth every 6 hours

## 2017-04-12 NOTE — DISCHARGE NOTE ADULT - PLAN OF CARE
to ambulate with free of pain to be pain free.  follow up with orthopedic for knee replacement. take medication regularly.   follow up with orthopedic for severe knee arthritis. continue with metformin discharged to [facility] [place] ([place]) c/w weight bearing as tolerated and give time the fracture to decrease swelling and heal. Then she needs to be followed up with ortho for knee replacement.   continue with pain management

## 2017-04-12 NOTE — DISCHARGE NOTE ADULT - CARE PROVIDER_API CALL
Mango Cruz), Orthopaedic Surgery  [address] [place] [address]  Phone: [phone]  Fax: [phone]

## 2017-04-13 RX ORDER — ZOLPIDEM TARTRATE 10 MG/1
5 TABLET ORAL AT BEDTIME
Qty: 0 | Refills: 0 | Status: DISCONTINUED | OUTPATIENT
Start: 2017-04-13 | End: 2017-04-13

## 2017-04-13 RX ADMIN — Medication 50 MILLIGRAM(S): at 05:53

## 2017-04-13 RX ADMIN — ATORVASTATIN CALCIUM 40 MILLIGRAM(S): 80 TABLET, FILM COATED ORAL at 21:12

## 2017-04-13 RX ADMIN — Medication 50 MICROGRAM(S): at 05:53

## 2017-04-13 RX ADMIN — Medication 50 MILLIGRAM(S): at 17:04

## 2017-04-13 RX ADMIN — Medication 375 MILLIGRAM(S): at 21:12

## 2017-04-13 RX ADMIN — Medication 375 MILLIGRAM(S): at 06:23

## 2017-04-13 RX ADMIN — LOSARTAN POTASSIUM 100 MILLIGRAM(S): 100 TABLET, FILM COATED ORAL at 05:53

## 2017-04-13 RX ADMIN — ENOXAPARIN SODIUM 40 MILLIGRAM(S): 100 INJECTION SUBCUTANEOUS at 12:12

## 2017-04-13 RX ADMIN — Medication 375 MILLIGRAM(S): at 13:43

## 2017-04-13 RX ADMIN — Medication 375 MILLIGRAM(S): at 13:13

## 2017-04-13 RX ADMIN — Medication 375 MILLIGRAM(S): at 05:53

## 2017-04-13 RX ADMIN — Medication 1: at 12:12

## 2017-04-13 RX ADMIN — Medication 375 MILLIGRAM(S): at 22:12

## 2017-04-14 RX ADMIN — Medication 375 MILLIGRAM(S): at 13:30

## 2017-04-14 RX ADMIN — TRAMADOL HYDROCHLORIDE 50 MILLIGRAM(S): 50 TABLET ORAL at 23:48

## 2017-04-14 RX ADMIN — Medication 50 MILLIGRAM(S): at 05:42

## 2017-04-14 RX ADMIN — Medication 375 MILLIGRAM(S): at 21:39

## 2017-04-14 RX ADMIN — TRAMADOL HYDROCHLORIDE 50 MILLIGRAM(S): 50 TABLET ORAL at 01:13

## 2017-04-14 RX ADMIN — Medication 50 MILLIGRAM(S): at 17:15

## 2017-04-14 RX ADMIN — ENOXAPARIN SODIUM 40 MILLIGRAM(S): 100 INJECTION SUBCUTANEOUS at 12:58

## 2017-04-14 RX ADMIN — TRAMADOL HYDROCHLORIDE 50 MILLIGRAM(S): 50 TABLET ORAL at 01:43

## 2017-04-14 RX ADMIN — Medication 375 MILLIGRAM(S): at 13:00

## 2017-04-14 RX ADMIN — Medication 375 MILLIGRAM(S): at 06:11

## 2017-04-14 RX ADMIN — Medication 375 MILLIGRAM(S): at 05:41

## 2017-04-14 RX ADMIN — ATORVASTATIN CALCIUM 40 MILLIGRAM(S): 80 TABLET, FILM COATED ORAL at 21:39

## 2017-04-14 RX ADMIN — Medication 375 MILLIGRAM(S): at 22:09

## 2017-04-14 RX ADMIN — Medication 50 MICROGRAM(S): at 05:42

## 2017-04-15 RX ADMIN — TRAMADOL HYDROCHLORIDE 50 MILLIGRAM(S): 50 TABLET ORAL at 11:37

## 2017-04-15 RX ADMIN — Medication 375 MILLIGRAM(S): at 05:27

## 2017-04-15 RX ADMIN — Medication 375 MILLIGRAM(S): at 13:41

## 2017-04-15 RX ADMIN — Medication 375 MILLIGRAM(S): at 21:41

## 2017-04-15 RX ADMIN — ENOXAPARIN SODIUM 40 MILLIGRAM(S): 100 INJECTION SUBCUTANEOUS at 11:54

## 2017-04-15 RX ADMIN — Medication 50 MICROGRAM(S): at 05:27

## 2017-04-15 RX ADMIN — Medication 50 MILLIGRAM(S): at 05:27

## 2017-04-15 RX ADMIN — TRAMADOL HYDROCHLORIDE 50 MILLIGRAM(S): 50 TABLET ORAL at 10:38

## 2017-04-15 RX ADMIN — Medication 375 MILLIGRAM(S): at 22:32

## 2017-04-15 RX ADMIN — TRAMADOL HYDROCHLORIDE 50 MILLIGRAM(S): 50 TABLET ORAL at 00:30

## 2017-04-15 RX ADMIN — Medication 50 MILLIGRAM(S): at 17:26

## 2017-04-15 RX ADMIN — Medication 375 MILLIGRAM(S): at 14:23

## 2017-04-15 RX ADMIN — ATORVASTATIN CALCIUM 40 MILLIGRAM(S): 80 TABLET, FILM COATED ORAL at 21:41

## 2017-04-15 RX ADMIN — Medication 375 MILLIGRAM(S): at 06:09

## 2017-04-15 RX ADMIN — LOSARTAN POTASSIUM 100 MILLIGRAM(S): 100 TABLET, FILM COATED ORAL at 05:27

## 2017-04-16 RX ORDER — HYDRALAZINE HCL 50 MG
25 TABLET ORAL ONCE
Qty: 0 | Refills: 0 | Status: COMPLETED | OUTPATIENT
Start: 2017-04-16 | End: 2017-04-16

## 2017-04-16 RX ADMIN — Medication 375 MILLIGRAM(S): at 21:55

## 2017-04-16 RX ADMIN — TRAMADOL HYDROCHLORIDE 50 MILLIGRAM(S): 50 TABLET ORAL at 02:18

## 2017-04-16 RX ADMIN — TRAMADOL HYDROCHLORIDE 50 MILLIGRAM(S): 50 TABLET ORAL at 23:58

## 2017-04-16 RX ADMIN — Medication 375 MILLIGRAM(S): at 21:23

## 2017-04-16 RX ADMIN — TRAMADOL HYDROCHLORIDE 50 MILLIGRAM(S): 50 TABLET ORAL at 12:59

## 2017-04-16 RX ADMIN — TRAMADOL HYDROCHLORIDE 50 MILLIGRAM(S): 50 TABLET ORAL at 00:59

## 2017-04-16 RX ADMIN — ENOXAPARIN SODIUM 40 MILLIGRAM(S): 100 INJECTION SUBCUTANEOUS at 12:12

## 2017-04-16 RX ADMIN — TRAMADOL HYDROCHLORIDE 50 MILLIGRAM(S): 50 TABLET ORAL at 12:30

## 2017-04-16 RX ADMIN — TRAMADOL HYDROCHLORIDE 50 MILLIGRAM(S): 50 TABLET ORAL at 23:15

## 2017-04-16 RX ADMIN — Medication 50 MICROGRAM(S): at 06:05

## 2017-04-16 RX ADMIN — LOSARTAN POTASSIUM 100 MILLIGRAM(S): 100 TABLET, FILM COATED ORAL at 06:05

## 2017-04-16 RX ADMIN — Medication 375 MILLIGRAM(S): at 06:05

## 2017-04-16 RX ADMIN — ATORVASTATIN CALCIUM 40 MILLIGRAM(S): 80 TABLET, FILM COATED ORAL at 21:23

## 2017-04-16 RX ADMIN — Medication 375 MILLIGRAM(S): at 14:36

## 2017-04-16 RX ADMIN — Medication 25 MILLIGRAM(S): at 22:15

## 2017-04-16 RX ADMIN — Medication 50 MILLIGRAM(S): at 06:05

## 2017-04-16 RX ADMIN — Medication 50 MILLIGRAM(S): at 17:09

## 2017-04-16 RX ADMIN — Medication 375 MILLIGRAM(S): at 15:32

## 2017-04-17 RX ORDER — HYDRALAZINE HCL 50 MG
25 TABLET ORAL ONCE
Qty: 0 | Refills: 0 | Status: COMPLETED | OUTPATIENT
Start: 2017-04-17 | End: 2017-04-17

## 2017-04-17 RX ORDER — NICOTINE POLACRILEX 2 MG
2 GUM BUCCAL
Qty: 0 | Refills: 0 | Status: DISCONTINUED | OUTPATIENT
Start: 2017-04-17 | End: 2017-04-18

## 2017-04-17 RX ORDER — NICOTINE POLACRILEX 2 MG
2 GUM BUCCAL
Qty: 0 | Refills: 0 | COMMUNITY

## 2017-04-17 RX ORDER — HYDRALAZINE HCL 50 MG
10 TABLET ORAL ONCE
Qty: 0 | Refills: 0 | Status: DISCONTINUED | OUTPATIENT
Start: 2017-04-17 | End: 2017-04-17

## 2017-04-17 RX ADMIN — TRAMADOL HYDROCHLORIDE 50 MILLIGRAM(S): 50 TABLET ORAL at 23:21

## 2017-04-17 RX ADMIN — Medication 2 MILLIGRAM(S): at 23:23

## 2017-04-17 RX ADMIN — Medication 375 MILLIGRAM(S): at 06:00

## 2017-04-17 RX ADMIN — LOSARTAN POTASSIUM 100 MILLIGRAM(S): 100 TABLET, FILM COATED ORAL at 06:00

## 2017-04-17 RX ADMIN — Medication 25 MILLIGRAM(S): at 18:35

## 2017-04-17 RX ADMIN — Medication 50 MICROGRAM(S): at 06:00

## 2017-04-17 RX ADMIN — ENOXAPARIN SODIUM 40 MILLIGRAM(S): 100 INJECTION SUBCUTANEOUS at 13:04

## 2017-04-17 RX ADMIN — Medication 2 MILLIGRAM(S): at 18:02

## 2017-04-17 RX ADMIN — ATORVASTATIN CALCIUM 40 MILLIGRAM(S): 80 TABLET, FILM COATED ORAL at 21:29

## 2017-04-17 RX ADMIN — Medication 375 MILLIGRAM(S): at 06:51

## 2017-04-18 VITALS
SYSTOLIC BLOOD PRESSURE: 145 MMHG | DIASTOLIC BLOOD PRESSURE: 74 MMHG | TEMPERATURE: 68 F | HEART RATE: 68 BPM | RESPIRATION RATE: 18 BRPM | OXYGEN SATURATION: 96 %

## 2017-04-18 PROCEDURE — 73700 CT LOWER EXTREMITY W/O DYE: CPT

## 2017-04-18 PROCEDURE — 83735 ASSAY OF MAGNESIUM: CPT

## 2017-04-18 PROCEDURE — 97116 GAIT TRAINING THERAPY: CPT

## 2017-04-18 PROCEDURE — 82553 CREATINE MB FRACTION: CPT

## 2017-04-18 PROCEDURE — 80053 COMPREHEN METABOLIC PANEL: CPT

## 2017-04-18 PROCEDURE — 80048 BASIC METABOLIC PNL TOTAL CA: CPT

## 2017-04-18 PROCEDURE — 97112 NEUROMUSCULAR REEDUCATION: CPT

## 2017-04-18 PROCEDURE — 82306 VITAMIN D 25 HYDROXY: CPT

## 2017-04-18 PROCEDURE — 83036 HEMOGLOBIN GLYCOSYLATED A1C: CPT

## 2017-04-18 PROCEDURE — 84100 ASSAY OF PHOSPHORUS: CPT

## 2017-04-18 PROCEDURE — 81001 URINALYSIS AUTO W/SCOPE: CPT

## 2017-04-18 PROCEDURE — 82607 VITAMIN B-12: CPT

## 2017-04-18 PROCEDURE — 96374 THER/PROPH/DIAG INJ IV PUSH: CPT

## 2017-04-18 PROCEDURE — 84443 ASSAY THYROID STIM HORMONE: CPT

## 2017-04-18 PROCEDURE — 87086 URINE CULTURE/COLONY COUNT: CPT

## 2017-04-18 PROCEDURE — 71045 X-RAY EXAM CHEST 1 VIEW: CPT

## 2017-04-18 PROCEDURE — 80061 LIPID PANEL: CPT

## 2017-04-18 PROCEDURE — 93005 ELECTROCARDIOGRAM TRACING: CPT

## 2017-04-18 PROCEDURE — 99285 EMERGENCY DEPT VISIT HI MDM: CPT | Mod: 25

## 2017-04-18 PROCEDURE — 97530 THERAPEUTIC ACTIVITIES: CPT

## 2017-04-18 PROCEDURE — 82746 ASSAY OF FOLIC ACID SERUM: CPT

## 2017-04-18 PROCEDURE — 85027 COMPLETE CBC AUTOMATED: CPT

## 2017-04-18 PROCEDURE — 84484 ASSAY OF TROPONIN QUANT: CPT

## 2017-04-18 PROCEDURE — 70450 CT HEAD/BRAIN W/O DYE: CPT

## 2017-04-18 PROCEDURE — 97110 THERAPEUTIC EXERCISES: CPT

## 2017-04-18 RX ADMIN — Medication 2 MILLIGRAM(S): at 11:36

## 2017-04-18 RX ADMIN — LOSARTAN POTASSIUM 100 MILLIGRAM(S): 100 TABLET, FILM COATED ORAL at 05:35

## 2017-04-18 RX ADMIN — TRAMADOL HYDROCHLORIDE 50 MILLIGRAM(S): 50 TABLET ORAL at 00:34

## 2017-04-18 RX ADMIN — Medication 50 MICROGRAM(S): at 05:35

## 2017-04-18 RX ADMIN — Medication 2 MILLIGRAM(S): at 05:36

## 2017-04-18 RX ADMIN — ENOXAPARIN SODIUM 40 MILLIGRAM(S): 100 INJECTION SUBCUTANEOUS at 11:36
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
## 2017-04-20 DIAGNOSIS — Z88.6 ALLERGY STATUS TO ANALGESIC AGENT: ICD-10-CM

## 2017-04-20 DIAGNOSIS — S82.142A DISPLACED BICONDYLAR FRACTURE OF LEFT TIBIA, INITIAL ENCOUNTER FOR CLOSED FRACTURE: ICD-10-CM

## 2017-04-20 DIAGNOSIS — Z79.84 LONG TERM (CURRENT) USE OF ORAL HYPOGLYCEMIC DRUGS: ICD-10-CM

## 2017-04-20 DIAGNOSIS — F17.210 NICOTINE DEPENDENCE, CIGARETTES, UNCOMPLICATED: ICD-10-CM

## 2017-04-20 DIAGNOSIS — Z71.6 TOBACCO ABUSE COUNSELING: ICD-10-CM

## 2017-04-20 DIAGNOSIS — I10 ESSENTIAL (PRIMARY) HYPERTENSION: ICD-10-CM

## 2017-04-20 DIAGNOSIS — E11.40 TYPE 2 DIABETES MELLITUS WITH DIABETIC NEUROPATHY, UNSPECIFIED: ICD-10-CM
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
## 2017-04-20 DIAGNOSIS — M17.0 BILATERAL PRIMARY OSTEOARTHRITIS OF KNEE: ICD-10-CM

## 2017-04-20 DIAGNOSIS — E03.9 HYPOTHYROIDISM, UNSPECIFIED: ICD-10-CM

## 2018-05-10 NOTE — DISCHARGE NOTE ADULT - HAS THE PATIENT RECEIVED THE INFLUENZA VACCINE DURING THIS VISIT
Patient is currently taking iron supplements every other or every third day as they constipated her too much and caused a hemorrhoid when she took them every day in the past. Her iron percent saturation is 11% at this time. She does feel lethargic and somewhat short of breath and believes her iron deficiency is due to malabsorption due to persistent diarrhea from IBS.   
Recently seen on CT abdomen, this patient is not experiencing left sided pain. She had a long history of iron deficiency anemia and continues breast cancer treatment.  
This patient states she has had chronic insomnia, sleeping only 3-4 hours per night for many months and potentially years. She is refusing a sleep study at this time as her previous sleep study approximately 5 years ago was a very uncomfortable experience for her. She was recommended to have a sleep study completed at her emergency room visit on April 24, 2018 but refuses this testing. She states she does have a history of apnea but states she has not been experiencing the symptoms of waking up, catching her breath as she had previously experienced so she does not believe she has apnea any longer despite her continued weight gain.  
This patient states that after walking with her coats she develops left-sided sciatic nerve pain at least 2 or 3 times per week. She has not tried nor does she wish to try any medication to alleviate this. She denies numbness or tingling in her feet and denies bowel or bladder incontinence.  
This patient was recently seen in the emergency room for shortness of breath for 3 days. She was determined not to have any abnormal testing with the exception of splenomegaly so was discharged home in stable condition. She has follow-up appointments with cardiology and oncology. Her recent echo was within normal limits.  
No